# Patient Record
Sex: FEMALE | Race: WHITE | URBAN - METROPOLITAN AREA
[De-identification: names, ages, dates, MRNs, and addresses within clinical notes are randomized per-mention and may not be internally consistent; named-entity substitution may affect disease eponyms.]

---

## 2022-05-25 ENCOUNTER — OFFICE VISIT (OUTPATIENT)
Dept: URBAN - METROPOLITAN AREA CLINIC 128 | Facility: CLINIC | Age: 22
End: 2022-05-25
Payer: COMMERCIAL

## 2022-05-25 ENCOUNTER — WEB ENCOUNTER (OUTPATIENT)
Dept: URBAN - METROPOLITAN AREA CLINIC 128 | Facility: CLINIC | Age: 22
End: 2022-05-25

## 2022-05-25 ENCOUNTER — DASHBOARD ENCOUNTERS (OUTPATIENT)
Age: 22
End: 2022-05-25

## 2022-05-25 DIAGNOSIS — R19.7 DIARRHEA: ICD-10-CM

## 2022-05-25 DIAGNOSIS — K59.00 CONSTIPATION, UNSPECIFIED CONSTIPATION TYPE: ICD-10-CM

## 2022-05-25 PROBLEM — 14760008: Status: ACTIVE | Noted: 2022-05-25

## 2022-05-25 PROCEDURE — 99204 OFFICE O/P NEW MOD 45 MIN: CPT | Performed by: PHYSICIAN ASSISTANT

## 2022-05-25 RX ORDER — LINACLOTIDE 72 UG/1
1 CAPSULE AT LEAST 30 MINUTES BEFORE THE FIRST MEAL OF THE DAY ON AN EMPTY STOMACH CAPSULE, GELATIN COATED ORAL ONCE A DAY
Qty: 30 | Refills: 5 | OUTPATIENT
Start: 2022-05-25 | End: 2022-11-20

## 2022-05-25 NOTE — PHYSICAL EXAM GASTROINTESTINAL
Abdomen , soft, nontender, nondistended , no guarding or rigidity , no masses palpable , normal bowel sounds, negative Bass's sign. negative psoas and obturators signs, negative CVA tenderness bilaterally Liver and Spleen , no hepatosplenomegaly Rectal deferred

## 2022-05-25 NOTE — HPI-OTHER HISTORIES
The patient is a new patient who elicits having diarrhea without blood or mucous and it alternates with constipation Patient has had how many bowel movements a day: She used to have 1 BM a day and now she has 1 BM every 3 days but with diarrhea at that time Duration of symptoms: since high school It is relieved by: avoiding dairy It is aggravated by: eating dairy The constipation is relieved when she is on her menstrual cycle. Associated symptoms: none Recent antibiotics: yes, in 03/2022, amoxicillin for a sinus infection Recent travel outside of [place]: none Recent sick contacts: none Current stress: yes Recent medication changes: none Recent dietary changes: none Recent weight changes: weight gain of 12 pounds Previous work up, labs, imaging: none Last colonoscopy date: never She denies a family history of IBD, colon polyps, colon cancer

## 2022-06-06 ENCOUNTER — LAB OUTSIDE AN ENCOUNTER (OUTPATIENT)
Dept: URBAN - METROPOLITAN AREA CLINIC 128 | Facility: CLINIC | Age: 22
End: 2022-06-06

## 2022-06-10 LAB
A/G RATIO: 1.8
ALBUMIN: 4.3
ALKALINE PHOSPHATASE: 65
ALT (SGPT): 13
AST (SGOT): 22
BASO (ABSOLUTE): 0
BASOS: 1
BILIRUBIN, TOTAL: <0.2
BUN/CREATININE RATIO: 17
BUN: 13
C DIFFICILE TOXINS A+B, EIA: NEGATIVE
C-REACTIVE PROTEIN, QUANT: 8
CALCIUM: 9.4
CALPROTECTIN, FECAL: <16
CAMPYLOBACTER CULTURE: (no result)
CARBON DIOXIDE, TOTAL: 24
CHLORIDE: 102
CREATININE: 0.76
E COLI SHIGA TOXIN EIA: NEGATIVE
EGFR: 115
ENDOMYSIAL ANTIBODY IGA: NEGATIVE
EOS (ABSOLUTE): 0.4
EOS: 6
FATS, NEUTRAL: NORMAL
FATS, TOTAL: (no result)
GIARDIA LAMBLIA AG, EIA: NEGATIVE
GLOBULIN, TOTAL: 2.4
GLUCOSE: 82
HEMATOCRIT: 39.3
HEMATOLOGY COMMENTS:: (no result)
HEMOGLOBIN: 12.7
IMMATURE CELLS: (no result)
IMMATURE GRANS (ABS): 0
IMMATURE GRANULOCYTES: 0
IMMUNOGLOBULIN A, QN, SERUM: 179
LYMPHS (ABSOLUTE): 1.5
LYMPHS: 23
Lab: (no result)
MCH: 28.3
MCHC: 32.3
MCV: 88
MONOCYTES(ABSOLUTE): 0.4
MONOCYTES: 6
NEUTROPHILS (ABSOLUTE): 4.3
NEUTROPHILS: 64
NRBC: (no result)
OVA + PARASITE EXAM: (no result)
PANCREATIC ELASTASE, FECAL: 290
PLATELETS: 279
POTASSIUM: 4.5
PROTEIN, TOTAL: 6.7
RBC: 4.49
RDW: 12.4
SALMONELLA/SHIGELLA SCREEN: (no result)
SODIUM: 139
T-TRANSGLUTAMINASE (TTG) IGA: <2
TSH: 0.6
WBC: 6.6
WHITE BLOOD CELLS (WBC), STOOL: (no result)

## 2022-06-22 ENCOUNTER — OFFICE VISIT (OUTPATIENT)
Dept: URBAN - METROPOLITAN AREA SURGERY CENTER 31 | Facility: SURGERY CENTER | Age: 22
End: 2022-06-22